# Patient Record
Sex: FEMALE | Race: OTHER | URBAN - METROPOLITAN AREA
[De-identification: names, ages, dates, MRNs, and addresses within clinical notes are randomized per-mention and may not be internally consistent; named-entity substitution may affect disease eponyms.]

---

## 2019-04-28 ENCOUNTER — EMERGENCY (EMERGENCY)
Facility: HOSPITAL | Age: 24
LOS: 0 days | Discharge: HOME | End: 2019-04-28
Admitting: EMERGENCY MEDICINE
Payer: COMMERCIAL

## 2019-04-28 VITALS
SYSTOLIC BLOOD PRESSURE: 117 MMHG | TEMPERATURE: 96 F | HEART RATE: 66 BPM | RESPIRATION RATE: 18 BRPM | DIASTOLIC BLOOD PRESSURE: 79 MMHG | OXYGEN SATURATION: 100 %
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
## 2019-04-28 DIAGNOSIS — M54.6 PAIN IN THORACIC SPINE: ICD-10-CM
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
## 2019-04-28 PROCEDURE — 99283 EMERGENCY DEPT VISIT LOW MDM: CPT

## 2019-04-28 NOTE — ED PROVIDER NOTE - NSFOLLOWUPCLINICS_GEN_ALL_ED_FT
[facility]  Rehabilitation  [address] [place] [address]  Phone: [phone]  Fax:   Follow Up Time: 1-3 Days
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
## 2019-04-28 NOTE — ED PROVIDER NOTE - NS ED ROS FT
Constitutional: no fever, chills   Cardiovascular: no chest pain, no sob, no syncope  Respiratory: no cough, no shortness of breath.  Gastrointestinal: no nausea, vomiting or abd pain  Musculoskeletal: see HPI  Integumentary: no rash or skin changes. no edema  Neurological: no head trauma. no headache, no dizziness, no visual changes, no UE/LE weakness or paresthesias. no change in mental status. no neck pain or stiffness.
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
## 2021-12-09 PROBLEM — Z78.9 OTHER SPECIFIED HEALTH STATUS: Chronic | Status: ACTIVE | Noted: 2019-04-28

## 2021-12-10 ENCOUNTER — APPOINTMENT (OUTPATIENT)
Dept: FAMILY MEDICINE | Facility: CLINIC | Age: 26
End: 2021-12-10
Payer: COMMERCIAL

## 2021-12-10 ENCOUNTER — NON-APPOINTMENT (OUTPATIENT)
Age: 26
End: 2021-12-10

## 2021-12-10 VITALS
HEIGHT: 63 IN | SYSTOLIC BLOOD PRESSURE: 120 MMHG | HEART RATE: 78 BPM | WEIGHT: 186 LBS | OXYGEN SATURATION: 98 % | BODY MASS INDEX: 32.96 KG/M2 | DIASTOLIC BLOOD PRESSURE: 82 MMHG

## 2021-12-10 DIAGNOSIS — Z78.9 OTHER SPECIFIED HEALTH STATUS: ICD-10-CM

## 2021-12-10 DIAGNOSIS — B36.0 PITYRIASIS VERSICOLOR: ICD-10-CM

## 2021-12-10 DIAGNOSIS — Z00.00 ENCOUNTER FOR GENERAL ADULT MEDICAL EXAMINATION W/OUT ABNORMAL FINDINGS: ICD-10-CM

## 2021-12-10 PROCEDURE — G0444 DEPRESSION SCREEN ANNUAL: CPT | Mod: 59

## 2021-12-10 PROCEDURE — 99213 OFFICE O/P EST LOW 20 MIN: CPT | Mod: 25

## 2021-12-10 PROCEDURE — 99385 PREV VISIT NEW AGE 18-39: CPT

## 2021-12-13 PROBLEM — B36.0 PITYRIASIS VERSICOLOR: Status: ACTIVE | Noted: 2021-12-13

## 2021-12-13 LAB
ALBUMIN SERPL ELPH-MCNC: 5 G/DL
ALP BLD-CCNC: 61 U/L
ALT SERPL-CCNC: 23 U/L
ANION GAP SERPL CALC-SCNC: 12 MMOL/L
APPEARANCE: CLEAR
AST SERPL-CCNC: 22 U/L
BASOPHILS # BLD AUTO: 0.02 K/UL
BASOPHILS NFR BLD AUTO: 0.3 %
BILIRUB SERPL-MCNC: 0.2 MG/DL
BILIRUBIN URINE: NEGATIVE
BLOOD URINE: NEGATIVE
BUN SERPL-MCNC: 14 MG/DL
CALCIUM SERPL-MCNC: 10 MG/DL
CHLORIDE SERPL-SCNC: 103 MMOL/L
CHOLEST SERPL-MCNC: 192 MG/DL
CO2 SERPL-SCNC: 24 MMOL/L
COLOR: NORMAL
CREAT SERPL-MCNC: 0.79 MG/DL
EOSINOPHIL # BLD AUTO: 0.16 K/UL
EOSINOPHIL NFR BLD AUTO: 2.3 %
GLUCOSE QUALITATIVE U: NEGATIVE
GLUCOSE SERPL-MCNC: 92 MG/DL
HCT VFR BLD CALC: 44.2 %
HDLC SERPL-MCNC: 76 MG/DL
HGB BLD-MCNC: 14.3 G/DL
IMM GRANULOCYTES NFR BLD AUTO: 0.3 %
KETONES URINE: NEGATIVE
LDLC SERPL CALC-MCNC: 102 MG/DL
LEUKOCYTE ESTERASE URINE: NEGATIVE
LYMPHOCYTES # BLD AUTO: 1.58 K/UL
LYMPHOCYTES NFR BLD AUTO: 22.8 %
MAN DIFF?: NORMAL
MCHC RBC-ENTMCNC: 31.2 PG
MCHC RBC-ENTMCNC: 32.4 GM/DL
MCV RBC AUTO: 96.3 FL
MONOCYTES # BLD AUTO: 0.53 K/UL
MONOCYTES NFR BLD AUTO: 7.6 %
NEUTROPHILS # BLD AUTO: 4.63 K/UL
NEUTROPHILS NFR BLD AUTO: 66.7 %
NITRITE URINE: NEGATIVE
NONHDLC SERPL-MCNC: 116 MG/DL
PH URINE: 5.5
PLATELET # BLD AUTO: 229 K/UL
POTASSIUM SERPL-SCNC: 4.6 MMOL/L
PROT SERPL-MCNC: 7.5 G/DL
PROTEIN URINE: NEGATIVE
RBC # BLD: 4.59 M/UL
RBC # FLD: 11.9 %
SODIUM SERPL-SCNC: 139 MMOL/L
SPECIFIC GRAVITY URINE: 1.01
TRIGL SERPL-MCNC: 69 MG/DL
TSH SERPL-ACNC: 1.86 UIU/ML
UROBILINOGEN URINE: NORMAL
WBC # FLD AUTO: 6.94 K/UL

## 2021-12-13 NOTE — HEALTH RISK ASSESSMENT
[Good] : ~his/her~  mood as  good [No falls in past year] : Patient reported no falls in the past year [No] : No [PHQ-2 Negative - No further assessment needed] : PHQ-2 Negative - No further assessment needed [PHQ-9 Negative - No further assessment needed] : PHQ-9 Negative - No further assessment needed [] : No [[id number]] : 0

## 2021-12-13 NOTE — PLAN
no
[FreeTextEntry1] : # blood work done in office\par # advise to use Selsun blue to shower with to help with darkened skin\par # patient declined receiving the Flu vaccine\par # f/u in three montha or sooner if needed

## 2021-12-13 NOTE — ASSESSMENT
[FreeTextEntry1] : DANIELA REYES is a 26 year old female presenting to the clinic to establish care.\par \par #PE/Vitals\par -normal\par \par #PHQ-2 Behavioral Health Screenin\par \par #Review Patient Medical History\par # Reviewed Patient Concerns\par - Darkened skin\par - diabetes; has a family history on both maternal and paternal side \par \par # Examined Patient Skin Area\par - possibly could be

## 2021-12-13 NOTE — END OF VISIT
[FreeTextEntry3] : This note was written by Merlyn Villeda on 12/10/2021, acting as a scribe for MD Quan.\par \par All medic record entries were at my, Dr.Meenu Rea MD, direction and personally dictated by me in 12/10/2021. I have personally reviewed the chart and agree hat the record accurately reflects my personal performance of the history, physical exam, assessment, and plan.

## 2021-12-13 NOTE — PHYSICAL EXAM
[No Acute Distress] : no acute distress [Well Nourished] : well nourished [Well Developed] : well developed [Well-Appearing] : well-appearing [Normal Sclera/Conjunctiva] : normal sclera/conjunctiva [PERRL] : pupils equal round and reactive to light [EOMI] : extraocular movements intact [Normal Outer Ear/Nose] : the outer ears and nose were normal in appearance [Normal Oropharynx] : the oropharynx was normal [No JVD] : no jugular venous distention [No Lymphadenopathy] : no lymphadenopathy [Supple] : supple [Thyroid Normal, No Nodules] : the thyroid was normal and there were no nodules present [No Respiratory Distress] : no respiratory distress  [No Accessory Muscle Use] : no accessory muscle use [Clear to Auscultation] : lungs were clear to auscultation bilaterally [Normal Rate] : normal rate  [Regular Rhythm] : with a regular rhythm [Normal S1, S2] : normal S1 and S2 [No Murmur] : no murmur heard [No Carotid Bruits] : no carotid bruits [No Abdominal Bruit] : a ~M bruit was not heard ~T in the abdomen [No Varicosities] : no varicosities [Pedal Pulses Present] : the pedal pulses are present [No Edema] : there was no peripheral edema [No Palpable Aorta] : no palpable aorta [No Extremity Clubbing/Cyanosis] : no extremity clubbing/cyanosis [Soft] : abdomen soft [Non Tender] : non-tender [Non-distended] : non-distended [No Masses] : no abdominal mass palpated [No HSM] : no HSM [Normal Bowel Sounds] : normal bowel sounds [Normal Posterior Cervical Nodes] : no posterior cervical lymphadenopathy [Normal Anterior Cervical Nodes] : no anterior cervical lymphadenopathy [No CVA Tenderness] : no CVA  tenderness [No Spinal Tenderness] : no spinal tenderness [No Joint Swelling] : no joint swelling [Grossly Normal Strength/Tone] : grossly normal strength/tone [Coordination Grossly Intact] : coordination grossly intact [No Focal Deficits] : no focal deficits [Normal Gait] : normal gait [Deep Tendon Reflexes (DTR)] : deep tendon reflexes were 2+ and symmetric [Normal Affect] : the affect was normal [Normal Insight/Judgement] : insight and judgment were intact [de-identified] : Rash located on her underarm and abdominal region

## 2021-12-13 NOTE — HISTORY OF PRESENT ILLNESS
[FreeTextEntry1] : PENELOPE [de-identified] : DANIELA REYES is a 26 year old female presenting to the clinic to establish care.. Mood is normal, appears well. Patient has not seen a primary care physician, here today in the office for routine check up. Only had blood work done when she went to her GYN. Has a concern about darkening of her skin around her underarm and abdominal region. Denies shaving, only gets waxes. Possibly could be pityriasis rosea . Has a another concern about diabetes, has a family history on both paternal and maternal side. Occupation is a  worker. \par \par Patient declined receiving the Flu vaccination.\par \par Currently taking no medications.No significant past medical history.No Surgical History  Family Medical History includes on maternal and paternal side diabetes.. Social History includes non-smoker. No known Allergies to food/medications\par \par Denies any recent ER visits/hospitalizations/ MVAs or MSK injuries. No complaints/concerns at this time.

## 2022-01-04 ENCOUNTER — TRANSCRIPTION ENCOUNTER (OUTPATIENT)
Age: 27
End: 2022-01-04

## 2022-01-04 LAB — 25(OH)D3 SERPL-MCNC: 22.6 NG/ML
